# Patient Record
Sex: FEMALE | URBAN - METROPOLITAN AREA
[De-identification: names, ages, dates, MRNs, and addresses within clinical notes are randomized per-mention and may not be internally consistent; named-entity substitution may affect disease eponyms.]

---

## 2020-11-02 PROCEDURE — U0003 INFECTIOUS AGENT DETECTION BY NUCLEIC ACID (DNA OR RNA); SEVERE ACUTE RESPIRATORY SYNDROME CORONAVIRUS 2 (SARS-COV-2) (CORONAVIRUS DISEASE [COVID-19]), AMPLIFIED PROBE TECHNIQUE, MAKING USE OF HIGH THROUGHPUT TECHNOLOGIES AS DESCRIBED BY CMS-2020-01-R: HCPCS | Performed by: FAMILY MEDICINE

## 2020-11-03 ENCOUNTER — LAB REQUISITION (OUTPATIENT)
Dept: LAB | Facility: HOSPITAL | Age: 30
End: 2020-11-03
Payer: COMMERCIAL

## 2020-11-03 DIAGNOSIS — Z11.59 ENCOUNTER FOR SCREENING FOR OTHER VIRAL DISEASES: ICD-10-CM

## 2020-11-03 DIAGNOSIS — Z03.818 ENCOUNTER FOR OBSERVATION FOR SUSPECTED EXPOSURE TO OTHER BIOLOGICAL AGENTS RULED OUT: ICD-10-CM

## 2020-11-04 LAB — SARS-COV-2 RNA SPEC QL NAA+PROBE: NOT DETECTED

## 2021-03-10 ENCOUNTER — LAB REQUISITION (OUTPATIENT)
Dept: LAB | Facility: HOSPITAL | Age: 31
End: 2021-03-10
Payer: COMMERCIAL

## 2021-03-10 DIAGNOSIS — Z03.818 ENCOUNTER FOR OBSERVATION FOR SUSPECTED EXPOSURE TO OTHER BIOLOGICAL AGENTS RULED OUT: ICD-10-CM

## 2021-03-10 DIAGNOSIS — Z11.59 ENCOUNTER FOR SCREENING FOR OTHER VIRAL DISEASES: ICD-10-CM

## 2021-03-10 PROCEDURE — U0003 INFECTIOUS AGENT DETECTION BY NUCLEIC ACID (DNA OR RNA); SEVERE ACUTE RESPIRATORY SYNDROME CORONAVIRUS 2 (SARS-COV-2) (CORONAVIRUS DISEASE [COVID-19]), AMPLIFIED PROBE TECHNIQUE, MAKING USE OF HIGH THROUGHPUT TECHNOLOGIES AS DESCRIBED BY CMS-2020-01-R: HCPCS | Performed by: FAMILY MEDICINE

## 2021-03-10 PROCEDURE — U0005 INFEC AGEN DETEC AMPLI PROBE: HCPCS | Performed by: FAMILY MEDICINE

## 2021-03-11 LAB — SARS-COV-2 RNA RESP QL NAA+PROBE: NEGATIVE

## 2024-11-12 ENCOUNTER — OFFICE VISIT (OUTPATIENT)
Age: 34
End: 2024-11-12
Payer: COMMERCIAL

## 2024-11-12 VITALS
HEIGHT: 67 IN | DIASTOLIC BLOOD PRESSURE: 60 MMHG | WEIGHT: 217 LBS | SYSTOLIC BLOOD PRESSURE: 114 MMHG | BODY MASS INDEX: 34.06 KG/M2

## 2024-11-12 DIAGNOSIS — M54.50 ACUTE BILATERAL LOW BACK PAIN WITHOUT SCIATICA: Primary | ICD-10-CM

## 2024-11-12 DIAGNOSIS — M99.05 SEGMENTAL DYSFUNCTION OF PELVIC REGION: ICD-10-CM

## 2024-11-12 DIAGNOSIS — M99.03 SEGMENTAL DYSFUNCTION OF LUMBAR REGION: ICD-10-CM

## 2024-11-12 DIAGNOSIS — M99.04 SEGMENTAL DYSFUNCTION OF SACRAL REGION: ICD-10-CM

## 2024-11-12 PROCEDURE — 99203 OFFICE O/P NEW LOW 30 MIN: CPT | Performed by: CHIROPRACTOR

## 2024-11-12 RX ORDER — DULOXETINE 40 MG/1
1 CAPSULE, DELAYED RELEASE ORAL DAILY
COMMUNITY
Start: 2024-10-28

## 2024-11-12 RX ORDER — CITALOPRAM HYDROBROMIDE 20 MG/1
TABLET ORAL
COMMUNITY

## 2024-11-20 ENCOUNTER — PROCEDURE VISIT (OUTPATIENT)
Age: 34
End: 2024-11-20
Payer: COMMERCIAL

## 2024-11-20 VITALS — WEIGHT: 217 LBS | BODY MASS INDEX: 34.87 KG/M2 | HEIGHT: 66 IN

## 2024-11-20 DIAGNOSIS — M99.04 SEGMENTAL DYSFUNCTION OF SACRAL REGION: ICD-10-CM

## 2024-11-20 DIAGNOSIS — M54.50 ACUTE BILATERAL LOW BACK PAIN WITHOUT SCIATICA: Primary | ICD-10-CM

## 2024-11-20 DIAGNOSIS — M99.03 SEGMENTAL DYSFUNCTION OF LUMBAR REGION: ICD-10-CM

## 2024-11-20 DIAGNOSIS — M99.05 SEGMENTAL DYSFUNCTION OF PELVIC REGION: ICD-10-CM

## 2024-11-20 PROCEDURE — 98941 CHIROPRACT MANJ 3-4 REGIONS: CPT | Performed by: CHIROPRACTOR

## 2024-11-20 NOTE — PROGRESS NOTES
Assessment:     1. Acute bilateral low back pain without sciatica        2. Segmental dysfunction of pelvic region        3. Segmental dysfunction of sacral region        4. Segmental dysfunction of lumbar region            Condition is the same.    PLAN/TREATMENT:    Return for treatment twice next week.   Consider imaging if no improvement.  Continue using ice as needed for post treatment soreness.  Continue stretching.    Treatment:  Myofascial release was performed to the lumbar paraspinals, quadratus lumborum, gluteus medius and piriformis bilaterally.    Manipulation was performed to L4-5, L5-S1 and the right sacroiliac joint utilizing side-lying flexion distraction as well as Garcia drop technique.  No HVLA was performed.    Subjective:  Annia is here today with an ongoing complaint of LBP.   Patient is feeling the same since initial evaluation.    Objective:  Pelvic obliquity is noted.  Joint dysfunctions present at the right sacroiliac joint, L5-S1, L4-5, T9-10, T8-T9.     Moderate spasm and tenderness is noted in the lumbar paraspinals, quadratus lumborum bilaterally more so on the right.  Moderate spasm and tenderness is noted in the thoracolumbar paraspinals as well as lower thoracic paraspinals bilaterally.

## 2024-11-27 ENCOUNTER — PROCEDURE VISIT (OUTPATIENT)
Age: 34
End: 2024-11-27
Payer: COMMERCIAL

## 2024-11-27 VITALS — BODY MASS INDEX: 34.06 KG/M2 | HEIGHT: 67 IN | WEIGHT: 217 LBS

## 2024-11-27 DIAGNOSIS — M99.03 SEGMENTAL DYSFUNCTION OF LUMBAR REGION: ICD-10-CM

## 2024-11-27 DIAGNOSIS — M54.50 ACUTE BILATERAL LOW BACK PAIN WITHOUT SCIATICA: Primary | ICD-10-CM

## 2024-11-27 DIAGNOSIS — M99.04 SEGMENTAL DYSFUNCTION OF SACRAL REGION: ICD-10-CM

## 2024-11-27 DIAGNOSIS — M99.05 SEGMENTAL DYSFUNCTION OF PELVIC REGION: ICD-10-CM

## 2024-11-27 PROCEDURE — 98941 CHIROPRACT MANJ 3-4 REGIONS: CPT | Performed by: CHIROPRACTOR

## 2024-11-27 NOTE — PROGRESS NOTES
Assessment:     1. Acute bilateral low back pain without sciatica        2. Segmental dysfunction of pelvic region        3. Segmental dysfunction of sacral region        4. Segmental dysfunction of lumbar region            Condition is the same.    PLAN/TREATMENT:    Return for treatment 1-2 times next weekl.     Continue using ice as needed for post treatment soreness.  Continue stretching.    Treatment:  Myofascial release was performed to the thoracolumbar paraspinals, lumbar paraspinals, quadratus lumborum, gluteus medius and piriformis bilaterally.    Manipulation was performed to L4-5, L5-S1 and the right sacroiliac joint utilizing side-lying flexion distraction as well as Garcia drop technique.  No HVLA was performed.    Subjective:  Annia is here today with an ongoing complaint of LBP. Does feel better.  Doesn't feel achy all the time. Feels treatment helped. Felt better after last visit but was slightly sore next day.      Objective:  Pelvic obliquity is noted.  Joint dysfunctions present at the right sacroiliac joint, L5-S1, L4-5, T9-10, T8-T9.     Moderate spasm and tenderness is noted in the lumbar paraspinals, quadratus lumborum bilaterally more so on the right.  Moderate spasm and tenderness is noted in the thoracolumbar paraspinals as well as lower thoracic paraspinals bilaterally.

## 2024-12-06 ENCOUNTER — PROCEDURE VISIT (OUTPATIENT)
Age: 34
End: 2024-12-06
Payer: COMMERCIAL

## 2024-12-06 VITALS — HEIGHT: 67 IN | BODY MASS INDEX: 34.06 KG/M2 | WEIGHT: 217 LBS

## 2024-12-06 DIAGNOSIS — M99.04 SEGMENTAL DYSFUNCTION OF SACRAL REGION: ICD-10-CM

## 2024-12-06 DIAGNOSIS — M99.03 SEGMENTAL DYSFUNCTION OF LUMBAR REGION: ICD-10-CM

## 2024-12-06 DIAGNOSIS — M99.05 SEGMENTAL DYSFUNCTION OF PELVIC REGION: ICD-10-CM

## 2024-12-06 DIAGNOSIS — M54.50 ACUTE BILATERAL LOW BACK PAIN WITHOUT SCIATICA: Primary | ICD-10-CM

## 2024-12-06 PROCEDURE — 98941 CHIROPRACT MANJ 3-4 REGIONS: CPT | Performed by: CHIROPRACTOR

## 2024-12-06 RX ORDER — ALBUTEROL SULFATE 0.83 MG/ML
2.5 SOLUTION RESPIRATORY (INHALATION) EVERY 4 HOURS PRN
COMMUNITY
Start: 2024-12-02 | End: 2025-12-02

## 2024-12-06 RX ORDER — AZITHROMYCIN 250 MG/1
TABLET, FILM COATED ORAL
COMMUNITY
Start: 2024-12-02

## 2024-12-06 NOTE — PROGRESS NOTES
Assessment:     1. Bilateral low back pain without sciatica        2. Segmental dysfunction of pelvic region        3. Segmental dysfunction of sacral region        4. Segmental dysfunction of lumbar region              Condition is progressing well.     PLAN/TREATMENT:    Return for treatment once next week.     Continue using ice as needed for post treatment soreness.  Continue stretching.    Treatment:  Myofascial release was performed to the thoracolumbar paraspinals, lumbar paraspinals, quadratus lumborum, gluteus medius and piriformis bilaterally.    Manipulation was performed to L4-5, L5-S1 and the right sacroiliac joint utilizing side-lying flexion distraction as well as Garcia drop technique.  No HVLA was performed.    Subjective:  Annia is here today with an ongoing complaint of LBP. Does feel a lot better.  Feels a little pain still on the right. Feels 70% better overall.    Objective:  Pelvic obliquity is noted.  Joint dysfunctions present at the right sacroiliac joint, L5-S1, L4-5, T9-10, T8-T9.     Moderate spasm and tenderness is noted in the lumbar paraspinals, quadratus lumborum bilaterally more so on the right.  Mild/moderate spasm and tenderness is noted in the thoracolumbar paraspinals as well as lower thoracic paraspinals bilaterally.

## 2024-12-06 NOTE — PROGRESS NOTES
Assessment/Plan:    1. Acute bilateral low back pain without sciatica        2. Segmental dysfunction of pelvic region        3. Segmental dysfunction of sacral region        4. Segmental dysfunction of lumbar region            Review of Diagnostic Studies and Office Notes:    The patient's urgent care visit note was reviewed prior to the chiropractic evaluation today.      Decision and Treatment Plan:  I reviewed my findings with the patient today.  Patient was interested in receiving a trial course of chiropractic care.  We will treat the patient 2x/week for the next three weeks and re-evaluate. If there is improvement in symptoms and objective findings, frequency will be reduced.   If there is no improvement we will consider referral to PMR and/or for imaging.    The patient will be treated with conservative chiropractic care including myofascial release, joint mobilization, and a home stretching and icing program.    Patient Instructions:    Return for later this week or early next week .   Ice 15 minutes 2-3 times per day.     TIME/Reviewed with Patient:  Time:    At least 34 minutes of time was spent with the patient during the consultation including the patient's history/current complaints, physical exam, reviewing the diagnostic report, reviewing home instruction/reducing risk factors with the patient, reviewing my findings with the patient, and discussing the treatment with the patient.     No manipulation was performed today.  Myofascial release was performed to the lumbar paraspinals, thoracolumbar paraspinals and quadratus lumborum bilaterally.    Review of Systems   Constitutional:  Negative for chills, fever and unexpected weight change.   Gastrointestinal:  Negative for constipation and diarrhea.   Genitourinary:  Negative for difficulty urinating and urgency.       Subjective:      Annia Mayo is a 34 y.o. female presents today for chiropractic evaluation and possible treatment.  She has a  "Anesthesia Transfer of Care Note    Patient: Karyna Sanders    Procedure(s) Performed: Procedure(s) (LRB):  FUSION, MTP JOINT (Left)  OSTEOTOMY, METATARSAL BONE (Left)  RESECTION, MUSCLE, GASTROCNEMIUS (Left)    Patient location: PACU    Anesthesia Type: general    Transport from OR: Transported from OR on room air with adequate spontaneous ventilation    Post pain: adequate analgesia    Post assessment: no apparent anesthetic complications and tolerated procedure well    Post vital signs: stable    Level of consciousness: responds to stimulation and sedated    Nausea/Vomiting: no nausea/vomiting    Complications: none    Transfer of care protocol was followedComments: Report given to PACU SIDRA Bustillo at bedside. Hand off tool used. RN given opportunity to ask questions or clarify concerns. No Concerns verbalized. RN was asked if ready to assume care of patient. RN verbally confirmed. Pt. left in stable condition. SV. Vital Signs Return to Near Baseline. No s/s of distress noted.       Last vitals: Visit Vitals  BP (!) 149/65   Pulse 80   Temp 37.1 °C (98.8 °F) (Temporal)   Resp 16   Ht 5' 2" (1.575 m)   Wt 72.1 kg (158 lb 15.2 oz)   SpO2 100%   Breastfeeding No   BMI 29.07 kg/m²     " "chief complaint of pain in the mid and lower back.  She states she started having pain approximately 3 weeks ago.  She states she woke with the pain.  She was seen at urgent care center and was told she has muscle spasm.  She was given diclofenac gel.  She describes the pain as stabbing originally.  Currently it feels achy and is constant.  The pain increases with lifting and bending.  Pain is worse on the right side.  She denies radiation of pain down the leg.  She denies numbness or tingling in the lower extremities.    She denies fever, chills, night sweats, recent unexplained weight loss, changes in bowel/bladder habits, urinary incontinence or retention.      Objective:     /60   Ht 5' 6.7\" (1.694 m)   Wt 98.4 kg (217 lb)   BMI 34.29 kg/m²     Appearance: Well developed, well nourished, and in no acute distress    Alert and oriented x 3    Mood/Affect: calm and pleasant    Gait/Posture:    Gait: Normal    Antalgic posture: None    Lordosis: Cervical- decreased    Lordosis: Lumbar- normal    Kyphosis: Thoracic- normal    Upper extremity reflexes:    Deep tendon reflexes were normal and symmetrical in the upper extremities.    Bergman's was negative bilaterally.      Lower extremity reflexes:    Deep tendon reflexes were normal and symmetrical in the bilateral lower extremities.    Lumbar Orthopedic Tests:    Supine Straight Leg Raise was negative  on the Right.    Supine Straight Leg Raise was negative  on the Left.    Active Lumbar Ranges of Motion:    Lumbar range of motion examination shows very slow flexion.  Extension is 30% restricted with pain.  Left lateral bending is 20% restricted with pain.  Right lateral bending is 20% restricted with pain.  Rotation is 20% restricted bilaterally with pain.    Palpation:  Standing position she has noted to have elevation of the leg crest on the right.  Pelvic obliquity is noted.  Joint dysfunctions present at the right sacroiliac joint, L5-S1, L4-5, T9-10, " T8-T9.    Moderate spasm and tenderness is noted in the lumbar paraspinals, quadratus lumborum bilaterally more so on the right.  Moderate spasm and tenderness is noted in the thoracolumbar paraspinals as well as lower thoracic paraspinals bilaterally.    Dictation voice to text software has been used in the creation of this document. Please consider this in light of any contextual or grammatical errors.

## 2024-12-09 ENCOUNTER — PROCEDURE VISIT (OUTPATIENT)
Age: 34
End: 2024-12-09
Payer: COMMERCIAL

## 2024-12-09 VITALS — HEIGHT: 67 IN | BODY MASS INDEX: 22.76 KG/M2 | WEIGHT: 145 LBS

## 2024-12-09 DIAGNOSIS — M99.04 SEGMENTAL DYSFUNCTION OF SACRAL REGION: ICD-10-CM

## 2024-12-09 DIAGNOSIS — M99.05 SEGMENTAL DYSFUNCTION OF PELVIC REGION: ICD-10-CM

## 2024-12-09 DIAGNOSIS — M99.03 SEGMENTAL DYSFUNCTION OF LUMBAR REGION: ICD-10-CM

## 2024-12-09 DIAGNOSIS — M54.50 ACUTE BILATERAL LOW BACK PAIN WITHOUT SCIATICA: Primary | ICD-10-CM

## 2024-12-09 PROCEDURE — 98941 CHIROPRACT MANJ 3-4 REGIONS: CPT | Performed by: CHIROPRACTOR

## 2024-12-09 NOTE — PROGRESS NOTES
Assessment:     1. Bilateral low back pain without sciatica        2. Segmental dysfunction of pelvic region        3. Segmental dysfunction of sacral region        4. Segmental dysfunction of lumbar region              Condition is progressing well.     PLAN/TREATMENT:    Return for treatment once next week.     Continue using ice as needed for post treatment soreness.  Continue stretching.    Treatment:  Myofascial release was performed to the thoracolumbar paraspinals, lumbar paraspinals, quadratus lumborum, gluteus medius and piriformis bilaterally.    Manipulation was performed to L4-5, L5-S1 and the right sacroiliac joint utilizing side-lying flexion distraction as well as Garcia drop technique.  No HVLA was performed.    Subjective:  Annia is here today with an ongoing complaint of LBP. Still has some pain but it's much better overall.  Most of the pain is in the right lumbar region.     Objective:  Pelvic obliquity is noted.  Joint dysfunctions present at the right sacroiliac joint, L5-S1, L4-5, T9-10, T8-T9.     Moderate spasm and tenderness is noted in the lumbar paraspinals, quadratus lumborum bilaterally more so on the right.  Mild/moderate spasm and tenderness is noted in the thoracolumbar paraspinals as well as lower thoracic paraspinals bilaterally.

## 2024-12-16 ENCOUNTER — PROCEDURE VISIT (OUTPATIENT)
Age: 34
End: 2024-12-16
Payer: COMMERCIAL

## 2024-12-16 VITALS — HEIGHT: 66 IN | WEIGHT: 145 LBS | BODY MASS INDEX: 23.3 KG/M2

## 2024-12-16 DIAGNOSIS — M54.50 ACUTE BILATERAL LOW BACK PAIN WITHOUT SCIATICA: Primary | ICD-10-CM

## 2024-12-16 DIAGNOSIS — M99.04 SEGMENTAL DYSFUNCTION OF SACRAL REGION: ICD-10-CM

## 2024-12-16 DIAGNOSIS — M99.03 SEGMENTAL DYSFUNCTION OF LUMBAR REGION: ICD-10-CM

## 2024-12-16 DIAGNOSIS — M99.05 SEGMENTAL DYSFUNCTION OF PELVIC REGION: ICD-10-CM

## 2024-12-16 PROCEDURE — 98941 CHIROPRACT MANJ 3-4 REGIONS: CPT | Performed by: CHIROPRACTOR

## 2024-12-16 NOTE — PROGRESS NOTES
Assessment:     1. Bilateral low back pain without sciatica        2. Segmental dysfunction of pelvic region        3. Segmental dysfunction of sacral region        4. Segmental dysfunction of lumbar region              Condition is progressing well.     PLAN/TREATMENT:    Return for treatment once next week.     Continue using ice as needed for post treatment soreness.  Continue stretching.    Treatment:  Myofascial release was performed to the thoracolumbar paraspinals, lumbar paraspinals, quadratus lumborum.     Manipulation was performed to L4-5, L5-S1 and the right sacroiliac joint utilizing side-lying flexion distraction as well as Garcia drop technique.  No HVLA was performed.    Subjective:  Annia is here today with an ongoing complaint of LBP. Feels a lot better.  Has some mild pain yet in right LB. Feels much better overall.    Objective:  Pelvic obliquity is noted.  Joint dysfunctions present at the right sacroiliac joint, L5-S1, L4-5, T9-10, T8-T9.     Mild spasm and tenderness is noted in the lumbar paraspinals, quadratus lumborum on the right.  Mild spasm and tenderness is noted in the thoracolumbar paraspinals as well as lower thoracic paraspinals on the right.

## 2024-12-23 ENCOUNTER — PROCEDURE VISIT (OUTPATIENT)
Age: 34
End: 2024-12-23
Payer: COMMERCIAL

## 2024-12-23 VITALS — BODY MASS INDEX: 23.3 KG/M2 | WEIGHT: 145 LBS | HEIGHT: 66 IN

## 2024-12-23 DIAGNOSIS — M54.50 ACUTE BILATERAL LOW BACK PAIN WITHOUT SCIATICA: Primary | ICD-10-CM

## 2024-12-23 DIAGNOSIS — M99.04 SEGMENTAL DYSFUNCTION OF SACRAL REGION: ICD-10-CM

## 2024-12-23 DIAGNOSIS — M99.03 SEGMENTAL DYSFUNCTION OF LUMBAR REGION: ICD-10-CM

## 2024-12-23 DIAGNOSIS — M99.05 SEGMENTAL DYSFUNCTION OF PELVIC REGION: ICD-10-CM

## 2024-12-23 PROCEDURE — 98941 CHIROPRACT MANJ 3-4 REGIONS: CPT | Performed by: CHIROPRACTOR

## 2024-12-23 NOTE — PROGRESS NOTES
Assessment:     1. Bilateral low back pain without sciatica        2. Segmental dysfunction of pelvic region        3. Segmental dysfunction of sacral region        4. Segmental dysfunction of lumbar region            Mildly flared LBP     PLAN/TREATMENT:    Return for treatment once next week.     Continue using ice as needed for post treatment soreness.  Continue stretching.    Treatment:  Myofascial release was performed to the thoracolumbar paraspinals, lumbar paraspinals, quadratus lumborum.     Manipulation was performed to L4-5, L5-S1 and the right sacroiliac joint utilizing side-lying flexion distraction as well as Garcia drop technique.  No HVLA was performed.    Subjective:  Annia is here today with an ongoing complaint of LBP. Feels a little worse after sledding yesterday. .After LB was sore on right.  Still more sore today.     Objective:  Pelvic obliquity is noted.  Joint dysfunctions present at the right sacroiliac joint, L5-S1, L4-5, T9-10, T8-T9.     Mild/moderate  spasm and tenderness is noted in the lumbar paraspinals, quadratus lumborum on the right.  Mild spasm and tenderness is noted in the thoracolumbar paraspinals as well as lower thoracic paraspinals on the right.

## 2025-01-07 ENCOUNTER — PROCEDURE VISIT (OUTPATIENT)
Age: 35
End: 2025-01-07
Payer: COMMERCIAL

## 2025-01-07 VITALS — BODY MASS INDEX: 23.3 KG/M2 | WEIGHT: 145 LBS | HEIGHT: 66 IN

## 2025-01-07 DIAGNOSIS — M99.03 SEGMENTAL DYSFUNCTION OF LUMBAR REGION: ICD-10-CM

## 2025-01-07 DIAGNOSIS — M99.05 SEGMENTAL DYSFUNCTION OF PELVIC REGION: ICD-10-CM

## 2025-01-07 DIAGNOSIS — M54.50 ACUTE BILATERAL LOW BACK PAIN WITHOUT SCIATICA: Primary | ICD-10-CM

## 2025-01-07 DIAGNOSIS — M99.02 SEGMENTAL DYSFUNCTION OF THORACIC REGION: ICD-10-CM

## 2025-01-07 DIAGNOSIS — M99.04 SEGMENTAL DYSFUNCTION OF SACRAL REGION: ICD-10-CM

## 2025-01-07 PROCEDURE — 98941 CHIROPRACT MANJ 3-4 REGIONS: CPT | Performed by: CHIROPRACTOR

## 2025-01-07 NOTE — PROGRESS NOTES
Assessment:     1. Bilateral low back pain without sciatica        2. Segmental dysfunction of pelvic region        3. Segmental dysfunction of sacral region        4. Segmental dysfunction of lumbar region        5. Segmental dysfunction of thoracic region          Mildly flared LBP.     PLAN/TREATMENT:    Return for treatment once next week.     Continue using ice as needed for post treatment soreness.  Continue stretching.    Treatment:  Myofascial release was performed to the thoracolumbar paraspinals, lumbar paraspinals, quadratus lumborum.     Manipulation was performed to T10-11, L4-5, L5-S1 and the right sacroiliac joint utilizing side-lying flexion distraction, gentle side posture mobilization, as well as Garcia drop technique.  No HVLA was performed.    Subjective:  Annia is here today with a complaint of LBP that is mainly right sided. Feels a little worse after just getting back from [place] yesterday.  She was down there to help her sister move into a new home. Was feeling better prior to helping her sister move.     Objective:  Pelvic obliquity is noted.  Joint dysfunctions present at the right sacroiliac joint, L5-S1, L4-5, T9-10, T8-T9.     Mild/moderate spasm and tenderness is noted in the lumbar paraspinals, quadratus lumborum on the right.  Mild spasm and tenderness is noted in the thoracolumbar paraspinals as well as lower thoracic paraspinals on the right.

## 2025-01-15 ENCOUNTER — PROCEDURE VISIT (OUTPATIENT)
Age: 35
End: 2025-01-15
Payer: COMMERCIAL

## 2025-01-15 VITALS — BODY MASS INDEX: 23.3 KG/M2 | WEIGHT: 145 LBS | HEIGHT: 66 IN

## 2025-01-15 DIAGNOSIS — M54.50 ACUTE BILATERAL LOW BACK PAIN WITHOUT SCIATICA: Primary | ICD-10-CM

## 2025-01-15 DIAGNOSIS — M99.04 SEGMENTAL DYSFUNCTION OF SACRAL REGION: ICD-10-CM

## 2025-01-15 DIAGNOSIS — M99.03 SEGMENTAL DYSFUNCTION OF LUMBAR REGION: ICD-10-CM

## 2025-01-15 DIAGNOSIS — M99.05 SEGMENTAL DYSFUNCTION OF PELVIC REGION: ICD-10-CM

## 2025-01-15 DIAGNOSIS — M99.02 SEGMENTAL DYSFUNCTION OF THORACIC REGION: ICD-10-CM

## 2025-01-15 PROCEDURE — 98941 CHIROPRACT MANJ 3-4 REGIONS: CPT | Performed by: CHIROPRACTOR

## 2025-01-15 NOTE — PROGRESS NOTES
Assessment:     1. Bilateral low back pain without sciatica        2. Segmental dysfunction of pelvic region        3. Segmental dysfunction of sacral region        4. Segmental dysfunction of lumbar region        5. Segmental dysfunction of thoracic region            PLAN/TREATMENT:    Return for treatment once next week.     Continue using ice as needed for post treatment soreness.  Continue stretching.    Treatment:  Myofascial release was performed to the thoracolumbar paraspinals, lumbar paraspinals, quadratus lumborum.     Manipulation was performed to T10-11, L4-5, L5-S1 and the right sacroiliac joint utilizing side-lying flexion distraction, gentle side posture mobilization, as well as Garcia drop technique.  No HVLA was performed.    Subjective:  Annia is here today with a complaint of LBP that is mainly right sided.  Was lifting a little yesterday and that aggravated LB.  Felt better after last visit until last few days.     Objective:  Pelvic obliquity is noted.  Joint dysfunctions present at the right sacroiliac joint, L5-S1, L4-5, T9-10, T8-T9.     Mild/moderate spasm and tenderness is noted in the lumbar paraspinals, quadratus lumborum on the right.  Mild spasm and tenderness is noted in the thoracolumbar paraspinals as well as lower thoracic paraspinals on the right.